# Patient Record
Sex: FEMALE | URBAN - METROPOLITAN AREA
[De-identification: names, ages, dates, MRNs, and addresses within clinical notes are randomized per-mention and may not be internally consistent; named-entity substitution may affect disease eponyms.]

---

## 2020-06-25 ENCOUNTER — OFFICE VISIT (OUTPATIENT)
Dept: FAMILY MEDICINE CLINIC | Facility: CLINIC | Age: 35
End: 2020-06-25
Payer: COMMERCIAL

## 2020-06-25 VITALS
HEIGHT: 66.5 IN | HEART RATE: 56 BPM | WEIGHT: 186 LBS | DIASTOLIC BLOOD PRESSURE: 66 MMHG | BODY MASS INDEX: 29.54 KG/M2 | RESPIRATION RATE: 18 BRPM | TEMPERATURE: 99 F | SYSTOLIC BLOOD PRESSURE: 112 MMHG

## 2020-06-25 DIAGNOSIS — D17.9 LIPOMA, UNSPECIFIED SITE: Primary | ICD-10-CM

## 2020-06-25 DIAGNOSIS — R06.83 SNORING: ICD-10-CM

## 2020-06-25 PROCEDURE — 99203 OFFICE O/P NEW LOW 30 MIN: CPT | Performed by: FAMILY MEDICINE

## 2020-06-25 RX ORDER — FLUTICASONE PROPIONATE 50 MCG
1 SPRAY, SUSPENSION (ML) NASAL DAILY
Qty: 1 BOTTLE | Refills: 0 | Status: SHIPPED | OUTPATIENT
Start: 2020-06-25

## 2020-06-25 NOTE — PROGRESS NOTES
[facility]  PROGRESS NOTE  Chief Complaint:   Patient presents with:  Establish Care  Sleep Problem: snoring alot lately   Cyst: in both forearm       HPI:   This is a 38year old female presents to establish care at clinic.   Patient has itching, or excessive skin dryness. See HPI  CARDIOVASCULAR:  Denies chest pain, chest pressure, chest discomfort, palpitations, edema, dyspnea on exertion or at rest.  RESPIRATORY:  Denies shortness of breath, wheezing, cough or sputum.   GASTROINTESTINAL unspecified site  Comments:  multiple  Orders:  -     SURGERY - EXTERNAL    Snoring    Other orders  -     Fluticasone Propionate 50 MCG/ACT Nasal Suspension; 1 spray by Each Nare route daily.         Patient Instructions   Trial of flonase and claritin or

## 2020-06-25 NOTE — PATIENT INSTRUCTIONS
Trial of flonase and claritin or zyrtec for 2 weeks. If no improvement then will consider evaluation for sleep study. Likely benign lipoma. May consider removing if causing irritation, infection or increase in size. Return to clinic if any concern.

## 2022-02-17 ENCOUNTER — OFFICE VISIT (OUTPATIENT)
Dept: FAMILY MEDICINE CLINIC | Facility: CLINIC | Age: 37
End: 2022-02-17
Payer: COMMERCIAL

## 2022-02-17 VITALS
HEART RATE: 62 BPM | RESPIRATION RATE: 18 BRPM | SYSTOLIC BLOOD PRESSURE: 100 MMHG | DIASTOLIC BLOOD PRESSURE: 68 MMHG | OXYGEN SATURATION: 98 % | WEIGHT: 191.63 LBS | TEMPERATURE: 99 F | HEIGHT: 66.5 IN | BODY MASS INDEX: 30.43 KG/M2

## 2022-02-17 DIAGNOSIS — R06.83 SNORING: ICD-10-CM

## 2022-02-17 DIAGNOSIS — R42 VERTIGO: ICD-10-CM

## 2022-02-17 DIAGNOSIS — Z00.00 PHYSICAL EXAM: Primary | ICD-10-CM

## 2022-02-17 DIAGNOSIS — R42 DIZZINESS: ICD-10-CM

## 2022-02-17 DIAGNOSIS — R53.83 OTHER FATIGUE: ICD-10-CM

## 2022-02-17 DIAGNOSIS — Z80.0 FAMILY HISTORY OF LYNCH SYNDROME: ICD-10-CM

## 2022-02-17 PROCEDURE — 3078F DIAST BP <80 MM HG: CPT | Performed by: FAMILY MEDICINE

## 2022-02-17 PROCEDURE — 99395 PREV VISIT EST AGE 18-39: CPT | Performed by: FAMILY MEDICINE

## 2022-02-17 PROCEDURE — 3008F BODY MASS INDEX DOCD: CPT | Performed by: FAMILY MEDICINE

## 2022-02-17 PROCEDURE — 3074F SYST BP LT 130 MM HG: CPT | Performed by: FAMILY MEDICINE

## 2022-02-17 PROCEDURE — 99213 OFFICE O/P EST LOW 20 MIN: CPT | Performed by: FAMILY MEDICINE

## 2022-02-17 RX ORDER — DIAZEPAM 2 MG/1
2 TABLET ORAL EVERY 6 HOURS PRN
COMMUNITY
Start: 2022-02-07

## 2022-02-17 NOTE — PATIENT INSTRUCTIONS
Continue current medications  May use flonase and claritin as needed   Schedule fasting labs at [facility].   [facility]  Address: [address] [facility], [address]  Phone: [phone]    Schedule appointment with gynecologist for pap smear. Schedule appointment with genetic counselor, sleep specialist and physical therapy. Return to clinic if no improvement in symptoms.

## 2022-02-24 LAB
% SATURATION: 39 % (CALC) (ref 16–45)
ABSOLUTE BASOPHILS: 41 CELLS/UL (ref 0–200)
ABSOLUTE EOSINOPHILS: 102 CELLS/UL (ref 15–500)
ABSOLUTE LYMPHOCYTES: 1545 CELLS/UL (ref 850–3900)
ABSOLUTE MONOCYTES: 250 CELLS/UL (ref 200–950)
ABSOLUTE NEUTROPHILS: 3162 CELLS/UL (ref 1500–7800)
ALBUMIN/GLOBULIN RATIO: 1.6 (CALC) (ref 1–2.5)
ALBUMIN: 4.2 G/DL (ref 3.6–5.1)
ALKALINE PHOSPHATASE: 41 U/L (ref 31–125)
ALT: 28 U/L (ref 6–29)
APPEARANCE: CLEAR
BASOPHILS: 0.8 %
BILIRUBIN, TOTAL: 0.4 MG/DL (ref 0.2–1.2)
BILIRUBIN: NEGATIVE
BUN: 12 MG/DL (ref 7–25)
CALCIUM: 9.2 MG/DL (ref 8.6–10.2)
CARBON DIOXIDE: 29 MMOL/L (ref 20–32)
CHLORIDE: 106 MMOL/L (ref 98–110)
CHOL/HDLC RATIO: 3.7 (CALC)
CHOLESTEROL, TOTAL: 200 MG/DL
COLOR: YELLOW
CREATININE: 0.74 MG/DL (ref 0.5–1.1)
EGFR IF AFRICN AM: 119 ML/MIN/1.73M2
EGFR IF NONAFRICN AM: 103 ML/MIN/1.73M2
EOSINOPHILS: 2 %
FERRITIN: 23 NG/ML (ref 16–154)
GLOBULIN: 2.6 G/DL (CALC) (ref 1.9–3.7)
GLUCOSE: 91 MG/DL (ref 65–99)
GLUCOSE: NEGATIVE
HDL CHOLESTEROL: 54 MG/DL
HEMATOCRIT: 41.6 % (ref 35–45)
HEMOGLOBIN A1C: 5.3 % OF TOTAL HGB
HEMOGLOBIN: 13.5 G/DL (ref 11.7–15.5)
IRON BINDING CAPACITY: 322 MCG/DL (CALC) (ref 250–450)
IRON, TOTAL: 125 MCG/DL (ref 40–190)
KETONES: NEGATIVE
LEUKOCYTE ESTERASE: NEGATIVE
LYMPHOCYTES: 30.3 %
MCH: 30 PG (ref 27–33)
MCHC: 32.5 G/DL (ref 32–36)
MCV: 92.4 FL (ref 80–100)
MONOCYTES: 4.9 %
MPV: 10.3 FL (ref 7.5–12.5)
NEUTROPHILS: 62 %
NITRITE: NEGATIVE
NON-HDL CHOLESTEROL: 146 MG/DL (CALC)
OCCULT BLOOD: NEGATIVE
PH: 7 (ref 5–8)
PLATELET COUNT: 222 THOUSAND/UL (ref 140–400)
POTASSIUM: 4.1 MMOL/L (ref 3.5–5.3)
PROTEIN, TOTAL: 6.8 G/DL (ref 6.1–8.1)
PROTEIN: NEGATIVE
RDW: 12.2 % (ref 11–15)
RED BLOOD CELL COUNT: 4.5 MILLION/UL (ref 3.8–5.1)
SODIUM: 138 MMOL/L (ref 135–146)
SPECIFIC GRAVITY: 1.02 (ref 1–1.03)
TSH W/REFLEX TO FT4: 1.75 MIU/L
VITAMIN B12: 349 PG/ML (ref 200–1100)
VITAMIN D, 1,25 (OH)2,$TOTAL: 42 PG/ML (ref 18–72)
VITAMIN D2, 1,25 (OH)2: 10 PG/ML
VITAMIN D3, 1,25 (OH)2: 32 PG/ML
WHITE BLOOD CELL COUNT: 5.1 THOUSAND/UL (ref 3.8–10.8)

## 2022-03-10 ENCOUNTER — GENETICS ENCOUNTER (OUTPATIENT)
Dept: GENETICS | Facility: HOSPITAL | Age: 37
End: 2022-03-10
Attending: GENETIC COUNSELOR, MS
Payer: COMMERCIAL

## 2022-03-10 ENCOUNTER — NURSE ONLY (OUTPATIENT)
Dept: HEMATOLOGY/ONCOLOGY | Facility: HOSPITAL | Age: 37
End: 2022-03-10
Attending: GENETIC COUNSELOR, MS
Payer: COMMERCIAL

## 2022-03-10 DIAGNOSIS — Z84.81 FAMILY HISTORY OF GENETIC MUTATION FOR HEREDITARY NONPOLYPOSIS COLORECTAL CANCER (HNPCC): Primary | ICD-10-CM

## 2022-03-10 PROCEDURE — 36415 COLL VENOUS BLD VENIPUNCTURE: CPT

## 2022-03-10 PROCEDURE — 96040 HC GENETIC COUNSELING EA 30 MIN: CPT | Performed by: GENETIC COUNSELOR, MS

## 2022-03-30 ENCOUNTER — GENETICS ENCOUNTER (OUTPATIENT)
Dept: HEMATOLOGY/ONCOLOGY | Facility: HOSPITAL | Age: 37
End: 2022-03-30

## 2022-03-30 PROBLEM — Z15.09 MSH6-RELATED LYNCH SYNDROME (HNPCC5): Status: ACTIVE | Noted: 2022-03-30

## 2022-04-25 ENCOUNTER — TELEPHONE (OUTPATIENT)
Dept: FAMILY MEDICINE CLINIC | Facility: CLINIC | Age: 37
End: 2022-04-25

## 2022-04-25 NOTE — TELEPHONE ENCOUNTER
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 Pt is seeing a gynecologist for this but also needs a referral to a Gastro dr.    Please advise.

## 2022-04-25 NOTE — TELEPHONE ENCOUNTER
Spoke to pt stated that she is positive for Valerio syndrome. Last appt pt stated she was told would need further testing if she is positive. Needs referral for gastro to get colonoscopy, endoscopy.     Please advise

## 2023-03-30 ENCOUNTER — OFFICE VISIT (OUTPATIENT)
Dept: FAMILY MEDICINE CLINIC | Facility: CLINIC | Age: 38
End: 2023-03-30
Payer: COMMERCIAL

## 2023-03-30 VITALS
DIASTOLIC BLOOD PRESSURE: 68 MMHG | BODY MASS INDEX: 30.7 KG/M2 | WEIGHT: 191 LBS | HEART RATE: 68 BPM | HEIGHT: 66.25 IN | TEMPERATURE: 98 F | SYSTOLIC BLOOD PRESSURE: 102 MMHG | RESPIRATION RATE: 16 BRPM | OXYGEN SATURATION: 99 %

## 2023-03-30 DIAGNOSIS — Z00.00 PHYSICAL EXAM: Primary | ICD-10-CM

## 2023-03-30 DIAGNOSIS — Z15.09 MSH6-RELATED LYNCH SYNDROME (HNPCC5): ICD-10-CM

## 2023-03-30 DIAGNOSIS — E78.2 MIXED HYPERLIPIDEMIA: ICD-10-CM

## 2023-03-30 PROCEDURE — 3078F DIAST BP <80 MM HG: CPT | Performed by: FAMILY MEDICINE

## 2023-03-30 PROCEDURE — 3008F BODY MASS INDEX DOCD: CPT | Performed by: FAMILY MEDICINE

## 2023-03-30 PROCEDURE — 3074F SYST BP LT 130 MM HG: CPT | Performed by: FAMILY MEDICINE

## 2023-03-30 PROCEDURE — 99396 PREV VISIT EST AGE 40-64: CPT | Performed by: FAMILY MEDICINE

## 2023-03-30 RX ORDER — TURMERIC 400 MG
CAPSULE ORAL
COMMUNITY

## 2023-03-30 RX ORDER — JUNIPER 300 MG
CAPSULE ORAL
COMMUNITY

## 2023-03-30 RX ORDER — CHLORAL HYDRATE 500 MG
1000 CAPSULE ORAL DAILY
COMMUNITY

## 2023-03-30 RX ORDER — MULTIVITAMIN
1 TABLET ORAL DAILY
COMMUNITY

## 2023-03-30 NOTE — PATIENT INSTRUCTIONS
Advice low cholesterol diet and exercise. May use fish oil supplement. Check records for Tdap booster.      Continue with colonoscopy  Continue to follow up with gynecologist.       Schedule labs at:    [address]  Address: [address] [facility], [address] [place] [address]  Phone: [phone]

## 2023-05-11 LAB
% SATURATION: 15 % (CALC) (ref 16–45)
ABSOLUTE BASOPHILS: 51 CELLS/UL (ref 0–200)
ABSOLUTE EOSINOPHILS: 143 CELLS/UL (ref 15–500)
ABSOLUTE LYMPHOCYTES: 1454 CELLS/UL (ref 850–3900)
ABSOLUTE MONOCYTES: 291 CELLS/UL (ref 200–950)
ABSOLUTE NEUTROPHILS: 3162 CELLS/UL (ref 1500–7800)
ALBUMIN/GLOBULIN RATIO: 1.7 (CALC) (ref 1–2.5)
ALBUMIN: 4.3 G/DL (ref 3.6–5.1)
ALKALINE PHOSPHATASE: 45 U/L (ref 31–125)
ALT: 29 U/L (ref 6–29)
AMYLASE: 48 U/L (ref 21–101)
APPEARANCE: CLEAR
AST: 21 U/L (ref 10–30)
BASOPHILS: 1 %
BILIRUBIN, TOTAL: 0.3 MG/DL (ref 0.2–1.2)
BILIRUBIN: NEGATIVE
BUN: 14 MG/DL (ref 7–25)
CALCIUM: 8.9 MG/DL (ref 8.6–10.2)
CARBON DIOXIDE: 26 MMOL/L (ref 20–32)
CHLORIDE: 105 MMOL/L (ref 98–110)
CHOL/HDLC RATIO: 3.1 (CALC)
CHOLESTEROL, TOTAL: 160 MG/DL
COLOR: YELLOW
CREATININE: 0.9 MG/DL (ref 0.5–0.99)
EGFR: 83 ML/MIN/1.73M2
EOSINOPHILS: 2.8 %
FERRITIN: 24 NG/ML (ref 16–154)
GLOBULIN: 2.6 G/DL (CALC) (ref 1.9–3.7)
GLUCOSE: 84 MG/DL (ref 65–99)
GLUCOSE: NEGATIVE
HDL CHOLESTEROL: 51 MG/DL
HEMATOCRIT: 42.1 % (ref 35–45)
HEMOGLOBIN A1C: 5.2 % OF TOTAL HGB
HEMOGLOBIN: 13.9 G/DL (ref 11.7–15.5)
IRON BINDING CAPACITY: 358 MCG/DL (CALC) (ref 250–450)
IRON, TOTAL: 52 MCG/DL (ref 40–190)
KETONES: NEGATIVE
LDL-CHOLESTEROL: 95 MG/DL (CALC)
LEUKOCYTE ESTERASE: NEGATIVE
LIPASE: 35 U/L (ref 7–60)
LYMPHOCYTES: 28.5 %
MCH: 31.5 PG (ref 27–33)
MCHC: 33 G/DL (ref 32–36)
MCV: 95.5 FL (ref 80–100)
MONOCYTES: 5.7 %
MPV: 10 FL (ref 7.5–12.5)
NEUTROPHILS: 62 %
NITRITE: NEGATIVE
NON-HDL CHOLESTEROL: 109 MG/DL (CALC)
OCCULT BLOOD: NEGATIVE
PH: 6 (ref 5–8)
PLATELET COUNT: 237 THOUSAND/UL (ref 140–400)
POTASSIUM: 4.4 MMOL/L (ref 3.5–5.3)
PROTEIN, TOTAL: 6.9 G/DL (ref 6.1–8.1)
PROTEIN: NEGATIVE
RDW: 12.1 % (ref 11–15)
RED BLOOD CELL COUNT: 4.41 MILLION/UL (ref 3.8–5.1)
SODIUM: 139 MMOL/L (ref 135–146)
SPECIFIC GRAVITY: 1.02 (ref 1–1.03)
TRIGLYCERIDES: 46 MG/DL
TSH W/REFLEX TO FT4: 1.83 MIU/L
VITAMIN B12: 332 PG/ML (ref 200–1100)
VITAMIN D, 1,25 (OH)2,$TOTAL: 33 PG/ML (ref 18–72)
VITAMIN D2, 1,25 (OH)2: <8 PG/ML
VITAMIN D3, 1,25 (OH)2: 33 PG/ML
WHITE BLOOD CELL COUNT: 5.1 THOUSAND/UL (ref 3.8–10.8)

## (undated) DIAGNOSIS — Z15.09 MSH6-RELATED LYNCH SYNDROME (HNPCC5): ICD-10-CM

## (undated) DIAGNOSIS — Z12.11 COLON CANCER SCREENING: Primary | ICD-10-CM